# Patient Record
Sex: FEMALE | Race: WHITE | NOT HISPANIC OR LATINO
[De-identification: names, ages, dates, MRNs, and addresses within clinical notes are randomized per-mention and may not be internally consistent; named-entity substitution may affect disease eponyms.]

---

## 2017-06-05 ENCOUNTER — RESULT REVIEW (OUTPATIENT)
Age: 57
End: 2017-06-05

## 2018-12-18 ENCOUNTER — RESULT REVIEW (OUTPATIENT)
Age: 58
End: 2018-12-18

## 2019-10-23 ENCOUNTER — APPOINTMENT (OUTPATIENT)
Dept: UROLOGY | Facility: CLINIC | Age: 59
End: 2019-10-23
Payer: COMMERCIAL

## 2019-10-23 VITALS
HEIGHT: 65 IN | HEART RATE: 73 BPM | WEIGHT: 172 LBS | OXYGEN SATURATION: 96 % | DIASTOLIC BLOOD PRESSURE: 70 MMHG | SYSTOLIC BLOOD PRESSURE: 120 MMHG | TEMPERATURE: 98.6 F | RESPIRATION RATE: 16 BRPM | BODY MASS INDEX: 28.66 KG/M2

## 2019-10-23 DIAGNOSIS — Z80.42 FAMILY HISTORY OF MALIGNANT NEOPLASM OF PROSTATE: ICD-10-CM

## 2019-10-23 DIAGNOSIS — Z78.9 OTHER SPECIFIED HEALTH STATUS: ICD-10-CM

## 2019-10-23 DIAGNOSIS — Z86.39 PERSONAL HISTORY OF OTHER ENDOCRINE, NUTRITIONAL AND METABOLIC DISEASE: ICD-10-CM

## 2019-10-23 PROBLEM — Z00.00 ENCOUNTER FOR PREVENTIVE HEALTH EXAMINATION: Status: ACTIVE | Noted: 2019-10-23

## 2019-10-23 PROCEDURE — 51798 US URINE CAPACITY MEASURE: CPT

## 2019-10-23 PROCEDURE — 99204 OFFICE O/P NEW MOD 45 MIN: CPT | Mod: 25

## 2019-10-23 NOTE — REVIEW OF SYSTEMS
[Dry Eyes] : dryness of the eyes [Vomiting] : vomiting [Heartburn] : heartburn [Wake up at night to urinate  How many times?  ___] : wakes up to urinate [unfilled] times during the night [Strong urge to urinate] : strong urge to urinate [Bladder pressure] : experiences bladder pressure [Limb Weakness] : limb weakness [Joint Pain] : joint pain [Muscle Weakness] : muscle weakness [Negative] : Heme/Lymph [Eyesight Problems] : no eyesight problems [Sore Throat] : no sore throat [Abdominal Pain] : no abdominal pain [Constipation] : no constipation [Diarrhea] : no diarrhea [see HPI] : see HPI [Incontinence] : incontinence [Bladder fullness after urinating] : bladder fullness after urinating [Leakage of urine with straining, coughing, laughing] : leakage of urine with straining, coughing, laughing [Joint Swelling] : no joint swelling [Limb Swelling] : no limb swelling [Dizziness] : no dizziness [Difficulty Walking] : no difficulty walking [Hot Flashes] : no hot flashes [Feelings Of Weakness] : no feelings of weakness [FreeTextEntry3] : Sinus Problem

## 2019-10-23 NOTE — ASSESSMENT
[FreeTextEntry1] : patent with feeling of incomplete empthing and double voiding for past month after takjng abx for diff reason\par office bladder sono : negative for pvr \par office renal sono bilat :no hydro or stones seen \par also with c/o 'bloating' \par will get formal bladder pre and post void with pelvic sono \par will send urine off for eval \par will notify of results \par patient encouraged to do kegles for NERY- mild and request bone density as menopausal x 10 years \par encourgged to do weight bearing exercise as only walks

## 2019-10-23 NOTE — HISTORY OF PRESENT ILLNESS
[FreeTextEntry1] : patient  with sxs of bloatig and incomplete emptying with double voids and opushing to void \par no dysuria or hematjurai but concerned related to UTI so urine dipped yesterday and neg by report\par not sexually active\par no new bowel issues -- chronic constipaton \par avg fluid intake \par no hematuria or recurrent uti\par some Davy with cough --mild but does not use pads   ( vag)

## 2019-10-23 NOTE — PHYSICAL EXAM
[General Appearance - Well Developed] : well developed [General Appearance - Well Nourished] : well nourished [Normal Appearance] : normal appearance [Well Groomed] : well groomed [Edema] : no peripheral edema [General Appearance - In No Acute Distress] : no acute distress [Respiration, Rhythm And Depth] : normal respiratory rhythm and effort [Exaggerated Use Of Accessory Muscles For Inspiration] : no accessory muscle use [Abdomen Soft] : soft [Abdomen Tenderness] : non-tender [Abdomen Hernia] : no hernia was discovered [Abdomen Mass (___ Cm)] : no abdominal mass palpated [Costovertebral Angle Tenderness] : no ~M costovertebral angle tenderness [Urethral Meatus] : normal urethra [External Female Genitalia] : normal external genitalia [Vagina] : normal vaginal exam [Normal Station and Gait] : the gait and station were normal for the patient's age [FreeTextEntry1] : soft urethra, no stool felt vaginally in rectal vault, no cystocele seen, good pelvic floor contraction R>L , not tender [] : no rash [No Focal Deficits] : no focal deficits [Affect] : the affect was normal [Oriented To Time, Place, And Person] : oriented to person, place, and time [Mood] : the mood was normal [Not Anxious] : not anxious [Cervical Lymph Nodes Enlarged Posterior Bilaterally] : posterior cervical [Cervical Lymph Nodes Enlarged Anterior Bilaterally] : anterior cervical [Supraclavicular Lymph Nodes Enlarged Bilaterally] : supraclavicular

## 2019-10-28 LAB
APPEARANCE: CLEAR
BACTERIA UR CULT: NORMAL
BACTERIA: NEGATIVE
BILIRUBIN URINE: NEGATIVE
BLOOD URINE: NEGATIVE
COLOR: NORMAL
GLUCOSE QUALITATIVE U: NEGATIVE
HYALINE CASTS: 0 /LPF
KETONES URINE: NEGATIVE
LEUKOCYTE ESTERASE URINE: NEGATIVE
MICROSCOPIC-UA: NORMAL
NITRITE URINE: NEGATIVE
PH URINE: 7
PROTEIN URINE: NEGATIVE
RED BLOOD CELLS URINE: 1 /HPF
SPECIFIC GRAVITY URINE: 1.01
SQUAMOUS EPITHELIAL CELLS: 1 /HPF
UROBILINOGEN URINE: NORMAL
WHITE BLOOD CELLS URINE: 0 /HPF

## 2019-11-07 ENCOUNTER — FORM ENCOUNTER (OUTPATIENT)
Age: 59
End: 2019-11-07

## 2019-11-08 ENCOUNTER — APPOINTMENT (OUTPATIENT)
Dept: ULTRASOUND IMAGING | Facility: CLINIC | Age: 59
End: 2019-11-08
Payer: COMMERCIAL

## 2019-11-08 ENCOUNTER — OUTPATIENT (OUTPATIENT)
Dept: OUTPATIENT SERVICES | Facility: HOSPITAL | Age: 59
LOS: 1 days | End: 2019-11-08
Payer: COMMERCIAL

## 2019-11-08 DIAGNOSIS — Z00.8 ENCOUNTER FOR OTHER GENERAL EXAMINATION: ICD-10-CM

## 2019-11-08 PROCEDURE — 76856 US EXAM PELVIC COMPLETE: CPT

## 2019-11-08 PROCEDURE — 76830 TRANSVAGINAL US NON-OB: CPT | Mod: 26

## 2019-11-08 PROCEDURE — 76857 US EXAM PELVIC LIMITED: CPT | Mod: 26,59

## 2019-11-08 PROCEDURE — 76856 US EXAM PELVIC COMPLETE: CPT | Mod: 26,59

## 2019-11-08 PROCEDURE — 76857 US EXAM PELVIC LIMITED: CPT

## 2019-11-08 PROCEDURE — 76830 TRANSVAGINAL US NON-OB: CPT

## 2019-11-11 ENCOUNTER — RESULT REVIEW (OUTPATIENT)
Age: 59
End: 2019-11-11

## 2020-01-08 ENCOUNTER — RESULT REVIEW (OUTPATIENT)
Age: 60
End: 2020-01-08

## 2020-02-19 ENCOUNTER — APPOINTMENT (OUTPATIENT)
Dept: UROLOGY | Facility: CLINIC | Age: 60
End: 2020-02-19
Payer: COMMERCIAL

## 2020-02-19 VITALS
SYSTOLIC BLOOD PRESSURE: 114 MMHG | BODY MASS INDEX: 28.82 KG/M2 | OXYGEN SATURATION: 96 % | HEIGHT: 65 IN | WEIGHT: 173 LBS | HEART RATE: 85 BPM | RESPIRATION RATE: 16 BRPM | TEMPERATURE: 99 F | DIASTOLIC BLOOD PRESSURE: 70 MMHG

## 2020-02-19 DIAGNOSIS — R39.14 FEELING OF INCOMPLETE BLADDER EMPTYING: ICD-10-CM

## 2020-02-19 DIAGNOSIS — R14.0 ABDOMINAL DISTENSION (GASEOUS): ICD-10-CM

## 2020-02-19 PROCEDURE — 99214 OFFICE O/P EST MOD 30 MIN: CPT | Mod: 25

## 2020-02-19 PROCEDURE — 51798 US URINE CAPACITY MEASURE: CPT

## 2020-02-19 NOTE — PHYSICAL EXAM
[General Appearance - Well Developed] : well developed [Normal Appearance] : normal appearance [General Appearance - Well Nourished] : well nourished [Well Groomed] : well groomed [Abdomen Soft] : soft [General Appearance - In No Acute Distress] : no acute distress [Abdomen Tenderness] : non-tender [Abdomen Mass (___ Cm)] : no abdominal mass palpated [] : no hepato-splenomegaly [Abdomen Hernia] : no hernia was discovered [Costovertebral Angle Tenderness] : no ~M costovertebral angle tenderness [FreeTextEntry1] : pvr 24 ml by bladder sono

## 2020-02-19 NOTE — HISTORY OF PRESENT ILLNESS
[FreeTextEntry1] : cont to have SP pressure and frequency to void with feeling of fullness after but  able to empty \par no dysuria or hematjuroia or fever but getting URI\par discussed last visit and exam signif for Pelvic floor dysfunctin \par discussed rehab to help relax pelvic floor \par c/o constipation \par suggested by GYN to have CYSTO and UDS to document above

## 2020-02-19 NOTE — ASSESSMENT
[FreeTextEntry1] : discussed that exam signif for PFD\par recommend rehab to help relax pelvic floor\par UDS can confirm this \par no need to do CYSTO as urine clear , no hematuria or recurrent uti but happy to do for reassurance\par will send urne now \par will give script for rehab\par sched above tests as needed

## 2020-02-20 LAB
APPEARANCE: CLEAR
BACTERIA: NEGATIVE
BILIRUBIN URINE: NEGATIVE
BLOOD URINE: NEGATIVE
COLOR: NORMAL
GLUCOSE QUALITATIVE U: NEGATIVE
HYALINE CASTS: 0 /LPF
KETONES URINE: NEGATIVE
LEUKOCYTE ESTERASE URINE: NEGATIVE
MICROSCOPIC-UA: NORMAL
NITRITE URINE: NEGATIVE
PH URINE: 6
PROTEIN URINE: NEGATIVE
RED BLOOD CELLS URINE: 1 /HPF
SPECIFIC GRAVITY URINE: 1.02
SQUAMOUS EPITHELIAL CELLS: 0 /HPF
UROBILINOGEN URINE: NORMAL
WHITE BLOOD CELLS URINE: 0 /HPF

## 2021-07-22 ENCOUNTER — RESULT REVIEW (OUTPATIENT)
Age: 61
End: 2021-07-22

## 2021-12-11 ENCOUNTER — TRANSCRIPTION ENCOUNTER (OUTPATIENT)
Age: 61
End: 2021-12-11

## 2022-03-10 ENCOUNTER — TRANSCRIPTION ENCOUNTER (OUTPATIENT)
Age: 62
End: 2022-03-10

## 2022-05-02 ENCOUNTER — APPOINTMENT (OUTPATIENT)
Dept: CT IMAGING | Facility: CLINIC | Age: 62
End: 2022-05-02
Payer: COMMERCIAL

## 2022-05-02 ENCOUNTER — OUTPATIENT (OUTPATIENT)
Dept: OUTPATIENT SERVICES | Facility: HOSPITAL | Age: 62
LOS: 1 days | End: 2022-05-02
Payer: COMMERCIAL

## 2022-05-02 DIAGNOSIS — R10.30 LOWER ABDOMINAL PAIN, UNSPECIFIED: ICD-10-CM

## 2022-05-02 PROCEDURE — 74177 CT ABD & PELVIS W/CONTRAST: CPT | Mod: 26

## 2022-05-02 PROCEDURE — 74177 CT ABD & PELVIS W/CONTRAST: CPT

## 2022-08-29 ENCOUNTER — RESULT REVIEW (OUTPATIENT)
Age: 62
End: 2022-08-29